# Patient Record
Sex: FEMALE | Race: WHITE
[De-identification: names, ages, dates, MRNs, and addresses within clinical notes are randomized per-mention and may not be internally consistent; named-entity substitution may affect disease eponyms.]

---

## 2017-04-27 NOTE — NUR
PT REPOSITIONED TO RIGHT LATERAL POSITION. PILLOWS PLACED BEHIND PT BACK AND
BUTTOCK FOR SUPPORT. PT RESTING WITH EYES CLOSED AND RESP EVEN AND UNLABORED,
O2 SAT 97% ON 4L VIA NC.

## 2017-04-27 NOTE — NUR
REPORTED BEING DIAPHORETIC, SPOT CHECK OF BLOOD SUGAR 118.  AIR CONDITIONER
TURNED ON.  ALERT AND ORIENTED.  TELE 28.  IVF NS @ 10 TO LAC WNL.  REDNESS
NOTED TO BLE.  BUTTOCK WITH REDNESS NOTED.  LUNG SOUNDS CLEAR, O2 AT 4L VIA
NC, NO SOB NOTED.  OBESE, BOWEL SOUNDS ACTIVE.  DICKENS TO GRAVITY.  NEEDS
ASSISTANCE IN AND OUT OF BED.  CALL LIGHT WITHIN REACH.

## 2017-04-27 NOTE — NUR
SEEN PATIENT ASLEEP, AROUSABLE, OPEN HER EYES AND WENT BACK TO SLEEP
RIGHTAWAY. NO RESPIRATORY DISTRESS NOTED ON O2 4LPM N/C. HOB ELEVATED AT
30DEG. IVF NS INFUSING AT 10ML/HR. DICKENS CATH DRAINING TO GRAVITY. REDNESS TO
BLE, OBDULIO. REDNESS TO BOTH BUTTOCKS AND ULCER TO RIGHT BUTTOCK, Z GUARD
APPLIED. WILL REPOSITION Q 2HRS AS TOLERATED. FALL PRECAUTION MANTAINED. CALL
LIGHT PLACED WITHIN REACH. SIDERAILS UP X2.

## 2017-04-27 NOTE — NUR
HAS BEEN NPO ALL DAY.  PER DR GONZALEZ, HE SPOKE WITH RADIOLOGY AND ASKED THEM
TO COME DO SBFT NOW.  THEY WILL COME UP AS SOON AS THEY CAN.

## 2017-04-27 NOTE — NUR
RECEIVED FROM ER VIA [employer] AWAKE, ALERT, ORIENTED X4. SLIGHTLY HAVING SOB, O2
4LPM APPLIED WITH HUMIDIFIER. HOB ELEVATED AT 30DEG. TELE INPLACED. O2SAT 94%
SLIGHTLY [place] NOTED. GENERALIZED BODY WEAKNESS. REDNESS TO BLE, OBDULIO. REDNESS TO
BUTTOCKS WITH ULCER TO RIGHT BUTTOCK, PLEASE SEE FROM WOUND PHOTO. BRUISE TO
RIGHT UPPER ARM FROM S/P FALL AT HOME. DICKENS CATH INSERTED IN ER WITH YELLOW
URINE COLORED NOTED. IV ACCESS TO LAC INTACT AND PATENT. CALL LIGHT INSTRUCTED
AND PLACED WITHIN EASY REACH. SIDERAILS UP X2.

## 2017-04-27 NOTE — NUR
FSBS-158, 3 UNITS OF IR GIVEN. MORPHINE 2MG IVP GIVEN FOR RIGHT FINGERS PAIN
5/10 ON PAIN SCALE. IVF NS INFUSING AT 10ML/HR. PERIAREA CLEANED AND Z GUARD
APPLIED, REPOSITIONED TO LEFT SIDE.

## 2017-04-27 NOTE — NUR
HYDRAZALINE GIVEN.  ATTEMPTED TO GET SIGNATURE ON CONSENT FOR RELEASE OF INFO
FROM DR MARTÍNEZ, BUT FELL ASLEEP.

## 2017-04-27 NOTE — NUR
SEEN PATIENT HAVING A RBEATHING TX BY RT. HER FRIEND AT BEDSIDE. EASILY TO
AROUSE. INFORMED PATIENT THAT SBFT WILL BE DONE TONIGHT IF PATIENT CAN
TOLERATE THE PO CONTRAST. PATIENT STS" I WILL TRY". DOCTOR TAT AT BEDSIDE. IVF
NS TKO AT 20ML/HR INFUSING WELL. DICKENS CATH DRAINING TO GRAVITY YELLOW URINE
COLORED. SCD TO BLE. WILL CONTINUE TO MONITOR.

## 2017-04-27 NOTE — NUR
AWAKE,ALERT, ORIENTED X4. ASISTED TO GET OUT OF BED AND SIT ON THE CHAIR AT
BEDSIDE. AIR MATTRESS PROVIDED. S/L FLUSHED. PATIENT OFF FLOOR FOR SBFT VIA
WHEELCHAIR. NO AUCTE DISTRESS NOTED.

## 2017-04-27 NOTE — NUR
UNABLE TO TOLERATED SBFT PROCESS. JUAN F BACK VIA WHEELCHAIR. NO RESP DISTRESS
NOTED. O2 4LPM N/C MAINTAINED. DOCTOR AISHA INFORMED. ANGELA WILL HAVE KUB
DONE IN AM.

## 2017-04-27 NOTE — NUR
CONSTANTLY ON CALL LIGHT FOR VARIOUS REQUESTS.  FRIEND MELISSA IN TO SEE PATIENT
AND CALM AT THIS TIME.  ATIVAN WAS GIVEN, SLEEPING.  RT IN TO GIVE RX.

## 2017-04-27 NOTE — NUR
RT GIVING RX.  FRIEND MELISSA CONTINUES BEDSIDE.  ATTEMPTED TO GET SIGNATURE ON
CONSENT FORM TO GET MEDICAL RECORDS FROM DR MARTÍNEZ, UNABLE TO SIGN AT THIS TIME.

## 2017-04-27 NOTE — NUR
PATIENT WAS BROUGHT IN BY EMS WITH COMPLAINT OF SHORTNESS OF BREATH X 4 DAYS.
SEEN WITH LABOURED RESPIRATION. SATURATION 78 ON ROOM AIR. O2 GIVEN VI A MASK.
SATURATION SLOWY CAME UP %. PATIENT HAS HISTORY OF COPD AND CHF.

## 2017-04-28 NOTE — NUR
Awake and verbally responsive. No Resp.distress noted on humidified 02 2lpm
via n/c. Denies pain at this time. HOB elevated. On low air loss mattress.
SCD in place. Will cont.to monitor. Call light within reach.

## 2017-04-28 NOTE — NUR
PATIENT REPORTS PAIN ASSOCIATED WITH DICKENS CATHETER. DICKENS IS PULLED TAUT.
MOVED STAT LOCK TO A CLOSER POSITION SO IT DOES NOT PULL. PATIENT REPORTS
IMPROVEMENT BUT STILL SOME SORENESS. NORCO GIVEN FOR PAIN PER MAR. GOOD
PERICARE PROVIDED AND STICKER PLACED ON DICKENS BAG. ASSISTED PATIENT BACK TO
BED TO POSITION OF COMFORT. WILL MONITOR.

## 2017-04-28 NOTE — NUR
UP WITH PT, DESATS INTO 80s WITH EXERTION, RETURNS TO 92-93 ON 2L NC WITH
REST. UP IN CHAIR AFTERWARD.

## 2017-04-28 NOTE — NUR
***PT NOTE***
 
[phone]
Pt IS A 70 Y/O FEMALE ADMITTED DUE TO SOB; DX WITH ACUTE RESP FAILURE, COPD
EXACERBATION, CHF EXACERBATION.
CXR- INCREASED PULMO CONGESTION AND EDEMA
XRAY ABD- NO EVID OF BOWEL OBSTRUCTION/FREE AIR
PMH: DM, OBESITY, HTN, COPD, PACEMAKER, AFIB, RA
Pt LIVES ALONE IN A SSH, WAS INDEP IN ADLs AND AMBULATION USING FWW W/SEAT FOR
HOUSEHOLD DISTANCE; USES 4WW FOR COMMUNITY; ON HOME O2 @ 2L NC, PER Pt SHE HAS
BEEN USING O2 CONSISTENTLY LATELY.
Pt WAS CLEARED FOR PT PER RN. Pt WAS SEEN AWAKE RESTING IN BED; AGREED TO
PARTICIPATE W/PT. HEPLOCKED BY RN
S:REPORTS LOWER ABD PAIN 5/10
O:BP AT REST 167/71, HR 70; SaO2 90-92% ON 2L NC AT REST
BED MOBILITY: SUPINE<->SIT CGA
TRANSFERS: SIT-STAND CGA
GAIT 75 FT FWW CGA; FLAT FOOT ON LLE WITH GENU VALGUM; SLOW TAMARA;
DENIES DIZZINESS DURING GAIT; PORT O2 IN TOW; SaO2 ON 2L NC POST ACTIVITY
FLUCTUATES TO 86-90%; RN AWARE.
Pt WAS ASSISTED IN SITTING ON A CHAIR BY BEDSIDE; TABLE AND CALL LIGHT IN
REACH; NOTIFIED Pt TO ASK NSG FOR ASSIST IN BTB; LEFT IN THE CARE OF THE
RN AND FRIEND.
A:Pt DEMONSTRATES WEAKNESS, DECREASED BALANCE/GAIT SAFETY. SaO2 DESAT
DURING ACTIVITY EVEN ON SUPPLEMENTAL O2. Pt WAS EDUCATED ON PROPER HAND
PLACEMENT FOR SIT-STAND, GOOD RETURN DEMO.
P:POC TO INCLUDE THEREX, THERACT, GAIT TRAINING, BALANCE EX, SAFETY EDUC;
ONCE DAILY 6X/WK X1 WEEK. HHPT IS RECOMMENDED POST ACUTE TO CONT W/REHAB.
DX AND POC DISCUSSED W/PTA.
 
[id number]
[id number], [id number], TUG SCORE=11sec
 
[id number]
THEREX FOR BOTH LE PERFORMED IN SITTING INCLUDING LAQ, ANKLE PUMPS, SEATED
MARCHING; X 10 REPS EACH.
THEREX8
PVE(1) ASSIST TO BATHROOM DUE TO BM

## 2017-04-28 NOTE — NUR
HAVING SOB ON EXERTION AT TIMES. GET OUT OF BED WITH ASSISTANCE. ON LOW AIR
MATTRESS. REPOSITIONED Q 2HRS. HAD SMALL BM X1. Z GUARD APPLIED TO BUTTOCKS.
REFUSED SCD. APPLE JUICE GIVEN X3 LAST NIGHT TOLERATED WELL. IVF TO LAC
INFUSING WELL.

## 2017-04-28 NOTE — NUR
A/OX4, DENIES HEADACHE OR DIZZINESS, SPEECH CLEAR AND SLOW, WORD FINDING
DIFFICULTY AT TIMES, COMMUNICATES NEEDS. TELE 28, PACED, RATE IN 70s, NO
COMPLAINT OF CHEST PAIN. PALPABLE PERIPHERAL PULSES. TRACE EDEMA TO RLE, NONE
TO LEFT. LUNGS CTA DESIRAE, REG RESPS, DYSPNEA ON EXERTION. 2L NC 92%. ABD OBESE,
NONTENDER, BOWEL SOUNDS ACTIVE, NO COMPLAINT OF MELYSSA/VTG/AMELIA. BM THIS MORNING,
SOFT. DICKENS IN PLACE DRAINING YELLOW URINE TO GRAVITY. GENERALIZED WEAKNESS,
UP WITH PT, AIR MATTRESS, ABLE TO REPOSITION SELF. REDNESS TO BLE. WILL
MONITOR.

## 2017-04-28 NOTE — NUR
Initial Nutrition Assessment
 
Dx: CHF, Chest Pain
PMHx: DM, obesity, HTN, COPD, pacemaker, a fib, RA
PSHx: Arthroscopy, cardiac ablation (2014), hysterectomy, pacemaker
Labs:  H, BUN 36 H, Cr 1.6 H, H/H 10.5/33 L; (4/27) ALB 3.2 L, A1C 5.8
Meds: Aldactone, Ativan, Colace, D50, humulin R, Lasix, NS IV, theragran,
zofran
Current Diet Order: Cardiac
PO Intakes: (4/27) L: Refused
Ht: 68", 5' 8". Wt: 209 lb, 95 kg. BMI: 31.9 kg/m2 (Obesity Class I)
IBW: 140 lb, 64 kg. %IBW: 148%. Adj BW: 157 lb, 71 kg. UBW: Pt unsure.  Wt Hx:
(8/16/16) 224 lb, 102 kg.
Age: 70 Y/O F
Food Allergies: None
Skin: Redness to both legs, shin area, redness to buttocks with ulcer to R
buttock. Sanchez 14. Per chart, pt has stasis dermatitis bilateral legs and
stage I pressure ulcer on buttocks.
Edema: 2+ BLE
GI: Active bowel sounds. Last BM 4/26.
 
Pt found with acute respiratory failure secondary to acute exacerbation of CHF
vs COPD, ACSDHF,  with cardiomyopathy, LVH, R atrial enlargement per
doctor's notes. Per doctor's progress note 4/28, no acute events overnight,
abd X-ray showed no evidence of bowel obstruction of free air, pt could not
tolerate small bowel follow through exam, preliminary results showed
nonobstructive gas pattern and bilateral lung bas atelectasis. Per [facility]
reports, possible palliative care, Dr. Nichols to see pt.
Per Dr. Nichols's note 4/27, pt is DNR, does not want intubation, non-compliant
with portable oxygen, interested in EOL option, but not ready to diet, pt
would need hospice or home health in order to return home, refused assisted
living.
Pt was seen resting in bed, lunch seen in front of pt, mostly untouched,
visitors at bedside. Pt appears overly-nourished. Pt reported she does not
like the sandwich and the watermelon tasted funny, would prefer to wait for
dinner tray. Pt also requested for oatmeal and 2% milk for breakfast, and a
menu to fill out. RD acknowledged and informed S staff to provide. RD also
offered snacks in between meals, pt declined.
 
Problem with: N: None. V: None. D: None. C: None.
Problems with: Chewing/Swallowing: None - Verified with pt
Current Appetite: Poor
Recent Weight Change: Yes per pt, however, unsure. Per EMR, -15 lb.
% Weight Change: 6.7% weight loss within 8 months
Vitamin/Supplement use: None
Diet at Home: Cardiac, CCHO, Fluid Restriction
Physical Activity: None
Education: RD discussed increasing PO intakes to aid in wound healing and
adequate nutrition status. Pt acknowledged.
 
Estimated Nutritional Needs Based  lb, 64 kg
Energy: 9629-4437 kcal/day (25-30 kcal/kg for Geriatric Maintenance)
Protein: 77-96 gm/day (1.2-1.5 gm/kg for COPD, Wound Healing)
Fluids: <1200 ml/day (CHF) or per doctor for CHF
 
Nutrition Diagnosis
1. Increase protein needs related to altered skin integrity as evidenced by
stage I pressure ulcer to R buttock
2. Inadequate oral intakes related to food preferences as evidenced by meal
refusals due to pickiness
 
Intervention
1. Recommend liberalize to 2 gm Na diet. No further restrictions warranted due
to low PO intakes.
2. Consider Boost Glucose Control TID if PO intakes <50%. This provides 750
kcal and 42 gm protein daily to aid in low PO intakes.
3. Encourage PO intakes.
4. Recommend Vitamin C 500 mg BID.
 
Monitor/Evaluate
Goal: PO intakes to meet at least 30% of estimated needs with tolerance
Monitor: PO intakes, tolerance to diet, labs, skin integrity, GI function,
weights
F/U in 3-5 days as MODERATE risk (5/1-5/3)

## 2017-04-28 NOTE — NUR
1. Recommend liberalize to 2 gm Na diet. No further restrictions warranted due
to low PO intakes.
2. Consider Boost Glucose Control TID if PO intakes <50%. This provides 750
kcal and 42 gm protein daily to aid in low PO intakes.
3. Encourage PO intakes.
4. Recommend Vitamin C 500 mg BID.

## 2017-04-28 NOTE — NUR
BEDSIDE REPORT RCD FROM DAVID ULRICH. PATIENT AWAKE ALERT, NO DISTRESS NOTED. NS
10 ML/HR TO RIGHT HAND KVO RATE, SITE PATENT. O2 2L NC, 93%. BED LOW, CALL
LIGHT IN PLACE. WILL MONITOR.

## 2017-04-29 NOTE — NUR
PT NODDING OFF IN CHAIR, TRANSFERRED BACK TO BED. ADMIN ATIVAN AS ORDERED FOR
FEELINGS OF ANXIETY. PATIENT FELL ASLEEP, NO SIGNS OF DISTRESS WILL CONTINUE
TO MONITOR.

## 2017-04-29 NOTE — NUR
PT SLEEPING. BREATHING UNLABORED, NO SIGNS OF DISTRESS. CALL LIGHT WITHIN
REACH. WILL CONTINUE TO MONITOR.

## 2017-04-29 NOTE — NUR
RECEIVED PT IN BED AWAKE, ALERT,ORIENTED X4. SHE DENIED HAVING PAIN AT THIS
TIME. LUNG SOUNDS DIMINISHED. ON O2 AT 2L VIA N/C. NO SOB AT THIS TIME.
BOWEL SOUNDS ACTIVE. W/ DICKENS CATH INTACT AND DRAINING YELLOW URINE. W/ TRACE
EDEMA TO BLE. PT ON AIR MATTRESS. W/ IVF NS AT 10 CC/HR INFUSING VIA RT HAND.
CALL LIGHT W/IN REACH.

## 2017-04-29 NOTE — NUR
PATIENT REQUESTED TO BE TRANSFERED TO CHAIR. ASSISTED WITH TRANSFER, CALL
LIGHT WITHIN REACH, TRANSFERED SAFELY. WILL CONTINUE TO MONITOR.

## 2017-04-29 NOTE — NUR
CLIENT ALERT AND ORIENTED X4 TO PERSON,PLACE, TIME, AND PURPOSE. DENIES CHEST
PAIN. TELE #28. NASAL CANNULA ON 2 LITERS. IV TO RIGHT HAND, INTACT. DICKENS
CATHETER IN PLACE PATENT, URINE YELLOW. SCD'S TO LOWER EXTREMITIES. CALL LIGHT
WITHIN REACH. BED IN LOWEST POSITION, WILL CONTINUR TO MONITOR.

## 2017-04-29 NOTE — NUR
INFORMED DR. CASTILLO THAT PT HAD EPISODE OF PVC'S AND PT HAS A PACEMEKER. PT HAD
NO C/O CHEST DISCOMFORT.

## 2017-04-29 NOTE — NUR
PATIENT STATES SHE IS FEELING VERY ANXIOUS/JITTERY, HOSPICE NURSE AT BEDSIDE.
WILL MEDICATE PRN (SEE EMAR).

## 2017-04-29 NOTE — NUR
Afebrile. No SOB noted.  Norco 1 tab given as ordered for c/o neck, shoulder
pain and seems helpful. Turned and repositioned self in bed. Weight shift
assistance provided as needed. Esquivel catheter care rendered. Cont.on IV flagyl
and levaquin. Paced rhythm. Cont.to monitor lab values.

## 2017-04-30 NOTE — NUR
ASSISTED CLIENT TO RESTROOM. HAD SMALL SOFT BM. PT COMPLAINS HAVING LEFT KNEE
PAIN 8/10. WILL MEDICATE PRN SEE EMAR.

## 2017-04-30 NOTE — NUR
PATIENT FINISHED 100% OF LUNCH. SITTING IN CHAIR TALKING WITH VISITOR.
BREATHING UNLABORED.NO SIGNS OF DISTRESS. WILL CONTINUE TO MONITOR.

## 2017-04-30 NOTE — NUR
ROUND WERE MADE. DR KHAN DISCUSSED DISCHARGE PLANNING POSSIBLT TOMORROW.
PATIENT UNDERSTAMDS INFORMATION GIVEN. NO QUESTIONS OR CONCERNS AT THIS TIME.

## 2017-04-30 NOTE — NUR
RECEIVED PT SITTING UP IN BED. SHE IS ALERT,ORIENTED X4. LUNG SOUNDS CLEAR BUT
DIMINISHED. PT STATED SHE IS BREATHING BETTER AND SOB IS LESSENED. ON O2 AT 2L
VIA N/C. NO C/O ABDL DISCOMFORT AT HIS TIME. W/ DICKENS CATH INTACT AND DRAINING
YELLOW URINE. W/ IVF NS AT 10 CC/HR INFUSING WELL VIA RT HAND. CALL LIGHT IS
W/IN REACH.

## 2017-04-30 NOTE — NUR
PT SLEPT THROUGH THE NIGHT. SHE HAD NO C/O PAIN. NO EPISODE OF SOB OR RESP.
DISTRESS. DICKENS CATH INTACT AND PATENT. IVF NS AT 10 CC/HR INFUSING WELL VIA
RT HAND. ALL NEEDS ATTENDED TO.

## 2017-04-30 NOTE — NUR
PT C/O PAIN TO HER LT KNEE 8/10 AFTER AMBULATING TO THE RESTROOM. MEDICATED
HER W/ NORCO 1 TAB PO FOR PAIN. ALSO MEDICATED W/ ATIVAN 1 MG PO FOR ANXIETY
AND FOR SLEEP .

## 2017-04-30 NOTE — NUR
PATIENT UP SITTING IN CHAIR, ALERT AND ORIENTEDX4. ABLE TO MAKE NEEDS KNOWN.
BREATHING UNLABORED, NO SIGNS OF DISTRESS. CALL LIGHT WITHIN REACH, WILL
CONTINUE TO MONITOR.

## 2017-04-30 NOTE — NUR
PATIENT SLEEPING, EASILY AOKEN WITH VERBAL STIMULI. ALERT AND ORIENTED X4.
BREATHING UNLABORED, NO SIGNS OF DISTRESS AND NO COMPLAINTS OF PAIN AT THIS
TIME. WILL CONTINUE TO MONITOR.

## 2017-05-01 NOTE — NUR
AT BEDSIDE ASSESSING PATIENT, ALL QUESTIONS AND CONCERNS
ADDRESSSED, CALL LIGHT WITHIN REACH, AND WILL CONTINUE TO MONITOR.

## 2017-05-01 NOTE — NUR
PATIENT HAD SECOND BM AND ASSISTED TO RESTROOM, ALSO CLEANED RIGHT BUTTOCKS
WOUND WITH WOUND CLEANSER AND APPLIED TEGADERM PER DOCTORS ORDER, AND ALSO
APPLIED Z-GUARD TO BUTTOCKS, PATIENT BACK ON CHAIR AT BEDSIDE, CALL LIGHT AND
BELONGINGS WITHIN REACH, AND WILL CONTINUE TO MONITOR.

## 2017-05-01 NOTE — NUR
PT REMAINS ASLEEP BUT EASILY AROUSABLE. SHE WAS MEDICATED FOR PAIN X1.
PT ABLE TO AMBULATE TO RESTROOM W/ ASSISTANCE. STILL W/ SOB ON EXERTION NOTED.
DICKENS CATH INTACT AND PATENT . PT HAD BM X1 THIS SHIFT. WOUND TO RT BUTTOCK
CLEANED AND APPLIED W/ Z-GUARD. DRESSING CHANGED. IVF NS AT 10 CC/HR INFUSING
VIA RT HAND.

## 2017-05-01 NOTE — NUR
PATIENT AAOX4, SITTING UP IN BED WATCHING TV, NO DISTRESS NOTED, RESPIRATIONS
EVEN AND UNLABORED AND ON 2L O2NC, DENIES PAIN, CALL LIGHT AND BELONGINGS
WITHIN REACH, AND WILL ENDORSE TO NIGHT NURSE.

## 2017-05-01 NOTE — NUR
PATIENT C/O ACHING PAIN TO LEFT KNEE 6/10, TORADOL 30MG IV Q8H PRN GIVEN FOR
PAIN, PATIENT BACK IN BED, FALL PRECAUTIONS IN PLACE, CALL LIGHT AND
BELONGINGS WITHIN REACH, AND WILL CONTINUE TO MONITOR.

## 2017-05-01 NOTE — NUR
MADE AWARE OF /45 HR 68, WILL GIVE ACDACTONE 25MG, CARDIZEM
120MG, LASIX 40MG, BUT TO HOLD LISINOPRIL 20MG, APRESOLINE 100MG, AND
LOPRESSOR 25MG, WILL FOLLOW ORDERS AND WILL CONTINUE TO MONITOR.

## 2017-05-01 NOTE — NUR
MADE AWARE OF /46 HR 68, WILL GIVE ACDACTONE 25MG, CARDIZEM
120MG, LASIX 40MG, BUT TO HOLD LISINOPRIL 20MG, APRESOLINE 100MG, AND
LOPRESSOR 25MG, WILL FOLLOW ORDERS AND WILL CONTINUE TO MONITOR.

## 2017-05-01 NOTE — NUR
RECEIVED PATIENT AAOX4, ABLE TO COMMUNICATE4 AND FOLLOW COMMANDS, NO DISTRESS
NOTED, TELE# 28 IN PLACED AND DENIES CHEST PAIN. PALPABLE PULSES TO BUE/BLE.
RESPIRATIONS EVEN AND UNLABORED, ON 2L O2 NC AND DENIES SOB, AND O2 SAT 94%.
DENIES N/V/D. VOIDS FREELY WITH DICKENS CATHEDER IN PLACE FLOWING YELLOW COLORED
URINE FREELY BY GRAVITY. ON AIR MATTRESS, WITH WOUND TO RIGHT BUTTOCKS COVERED
IN ISLAND WOUND DRESSING CDI. DENIES PAIN. NS INFUSING AT 10ML/HR FREELY TO RH
CDI WITH NO SIGNS OF INFILTRATION NOTED. BED TO LOWEST POSITION, SIDE RAILS UP
X2, CALL LIGHT AND BELONGINGS WITHIN REACH, FALL PRECAUTIONS IN PLACE, AND
WILL CONTINUE TO MONITOR.

## 2017-05-01 NOTE — NUR
***PT NOTES***
TIME 4635-7680
S: CLEARED BY RN FOR P.T. TX. PATIENT IS AWAKE & ALERT IN A SEMI BARRIENTOS
POSITION IN BED. AGREEABLE TO P.T. TX. C/O L KNEE PAIN 6/10. RN NOTIFIED.
O: VS AT REST /41, HR 70 BPM ,SPO2 ON 2LPM 96%
   BED MOBILITY: SUPINE>SIT SBA/INDEPENDENT.
   TRANSFER: SIT<>STAND W/ FWW CGA. TOILETING W/ CGA. VC GIVEN PROPER
HANDPLACEMENT FOR SIT<>STAND TRANSFERS. ADDITIONAL CUES W/ USE OF RESTROOM
GRAB BAR TO ASSIST W/ TOILETING.
   GAIT: 45FT W/ FWW CGA. PORTABLE O2 TANK IN TOW @ 2LPM. PATIENT DEMO'S
ANTALGIC GAIT W/ INCREASE ANTERIOR TRUNK LEAN. SHORTENED STEP LENGTH. REQUIRES
STANDING REST PERIODS D/T FATIGUE. GAIT LIMITED AT THIS TIME D/T L KNEE PAIN
& DECREASE ENDURANCE. VC GIVEN TO CORRECT POSTURAL ALIGNMENT & PROPER GAIT
SEQUENCE. EDUCATED PATIENT ON DEEP BREATHING TECHNIQUES, SAFETY FOR FALL
PREVENTION, & REPOSITIONING TO PREVENT FURTHER SKIN BREAKDOWN. PATIENT
VERBALIZED UNDERSTANDING.
INSTRUCTED PATIENT IN THER EX FOR BILAT LE/UE AS DAVIN. PATIENT IS SAFELY &
COMFORTABLY SITTING UP IN A CHAIR BY BEDSIDE W/ CALL BUTTON & TABLE IN REACH.
O2 VIA NC IN PLACE. LEFT IN CARE OF RN.
P: DISCUSSED W/ PRIMARY PHYSICAL THERAPIST
[id number]',[id number]',[id number]((2) ADDITIONAL STAFF PRESENT FOR SAFETY)

## 2017-05-01 NOTE — NUR
PT SEEN, ASLEEP BUT EASILY AROUSABLE, ALERT AND ORIENTED WITH PERIOD OF
FORGETFUL, DENIES HEADACHE OR DIZZINESS, BREATHING EVEN AND UNLABORED, NO SOB,
LUNG SOUNDS DIMINISHED, ON O2 2L VIA NC WITH NO RESP DISTRESS NOTED, RT
PROTOCOL, IVF INFUSING WELL, PULSES PALPABLE, NO EDEMA NOTED, SCD TO BLE, PT
ON AIR MATTRESS, ABD OBESE WITH ACTIVE BS, NO BM AT THIS TIME, DENIES ABD
PAIN, INCONTINENT AT TIMES, FOELY DC'D BY DAY SHIFT, STILL WAITING PT TO VOID,
SMALL PRESSURE ULCER TO RT BUTTOCKS, COVERED WITH DRESSING, BED ALARM ON, SIDE
RAILS UP, NO DISTRESS NOTED, WILL KEEP TO MONITOR.

## 2017-05-02 NOTE — NUR
DR. LEONARD ORDERED TO URINARY CATHETER INSERTION FOR BLADDER TRAINING PURPOSE
IF PT IS NOT ABLE TO URINATE. HOWEVER, PT URINATED AT 1800 AFTER LASIX GIVEN.
PAGED DR. LEONARD BUT NO CALL BACK. WILL ENDOSE COMING NURSING TO FOLLOW UP AND
MEASURE PT'S OUTPUT. PT BREATHING ON O2 2L VIA NC. EVEN, UNLABORED. IV SITE
PATENT, INTACT. IVF HEP LOCK AT THIS TIME.

## 2017-05-02 NOTE — NUR
***PT NOTES***
TIME 6810-5127
S: CLEARED BY RN FOR P.T. TX. PATIENT IS AWAKE & ALERT IN A SEMI BARRIENTOS
POSITION IN BED. AGREEABLE TO P.T. TX. C/O L KNEE PAIN 7/10. RN IS AWARE.
O: VS AT REST /46, HR 70 BPM, SPO2 ON 2LPM 97%
   BED MOBILITY: SUPINE<>SIT INDEPENDENT.
   TRANSFER: SIT<>STAND W/ FWW CGA. STILL CONTINUES TO REQUIRE VC W/ PROPER
SEQUENCE FOR A SIT<>STAND TRANSFER.
   GAIT: 60FT W/ FWW CGA. PATIENT DEMO'S A DECREASE GAIT VELOCITY. REQUIRES 3
STANDING REST PERIODS. PORTABLE O2 TANK IN TOW @ 2LPM VIA NC. GAIT DISTANCE
LIMITED D/T L KNEE PAIN & DECREASE ENDURANCE. L KNEE GENU VALGUS. ANTALGIC
GAIT. REQUIRES VC FOR PROPER GAIT SEQUENCE. INCREASE ANTERIOR TRUNK LEAN W/
CUES TO CORRECT POSTURAL ALIGNMENT. EDUCATED PATIENT ON DEEP BREATHING
TECHNIQUES, ENERGY CONSERVATION, & SAFETY FOR FALL PREVENTION W/ G
UNDERSTANDING. COOPERATIVE & APPRECIATIVE OF CARE.
THER EX SHOULDER FLEXION, ELBOW FLEX/EXT, ANKLE DF/PF, KNEE FLEX/EXT AS DAVIN.
PATIENT IS SAFELY & COMFORTABLY SITTING UP IN A CHAIR BY BEDSIDE W/ CALL BUTON
& TABLE IN REACH. LEFT IN CARE OF RN.
P: DISCUSSED W/ PRIMARY PHYSICAL THERAPIST
[id number]',TA10',[id number]'

## 2017-05-02 NOTE — NUR
PT SEEN SLEEPING BUT AROUSABLE. PT BREATHING ON O2 2L VIA NC, EVEN, UNLABORED.
DICKENS IN PLACE, DRAINING FREELY VIA GRAVITY. URINE COLOR YELLOW. IV SITE
PATENT, INTACT. IVF INFUSING WELL.

## 2017-05-02 NOTE — NUR
PATIENT ASLEEP AND EASILY AROUSABLE. SLEPT MOST OF THE NIGHT. IVF INFUSING
WELL. NO BM DURING THE SHIFT. MORNING  MG/DL. ON AIR MATTRESS. ON THE O2
2L NC. NO RESP DISTRESS OR SOB NOTED. BED ALARM ON.

## 2017-05-02 NOTE — NUR
ASSISTED PT TO BSC, PT UNABLE TO URINATE AT THIS TIME, PT BLADDER IS
DISTENDED, PERFORMED BLADDER SCAN- PT WITH 400 ML URINE IN BLADDER, MADE DR SAUER AWARE, NEW ORDER RECEIVED FOR DICKENS CATH INSERTION, DICKENS PLACED AND PT
TOLERATED WELL.

## 2017-05-02 NOTE — NUR
RECEIVED REPORT FROM TRINH BA SEEN LYING IN BED, HAS HER EYES CLOSED. NO
SOB/PAIN NOTED. ON 2LPM/NC W/ HUMIDIFIER. ON AIR MATTRESS. W/ BEDSIDE COMMODE.
POSITIONED ON HER RIGHT SIDE AT THIS TIME. CALL LIGHT ON REACH. SIDE RAILS
UPX2. WILL CONT TO MONITOR.

## 2017-05-02 NOTE — NUR
RECEIVED Pt AAOX3 CALM AND COOPERATIVE WITH CARE. WITH VISITOR AT BEDSIDE. Pt
DENIES ANY CHEST PAIN. LUNG SOUND ARE CTA/DIMINISHED TO BASES. PULSE OX IS 96%
ON 2L/NC. NO RESP DISTRESS NOTED. ACTIVE BOWEL SOUNDS X4 QUADS. DENIES ANY ABD
DISCOMFORT. VOIDS FREELY, USING THE BEDSIDE COMMODE. BUTTOCKS AREA WITH
TRGADERMS IN PLACE CDI AND Z-GUARD. RADIAL AND PEDAL PULSES ARE PRESENT. MOVES
ALL EXTREMITIES. FALL PRECAUTIONS IN PLACE. Pt RE-ORIENTED TO ROOM, CALL LIGHT
SYTEM AND POC. WILL CONTINUE TO MONITOR.

## 2017-05-02 NOTE — NUR
B/P MEDICATIONS WITHELD AT THIS TIME, DUE TO BLOOD PRESSURE READING /50
HR 65. MEDICATED WITH ATIVAN 1 MG PO AS REQUESTED BY Pt. ASSISTED TO BEDSIDE
COMMODE AND VOIDED 350 ML OF YELLOW CLEAR URINE. Pt ASSISTED BACK TO BED.
2L/NC IN PLACE, CALL LIGHT WITHIN REACH. WILL CONTINUE TO MONITOR.

## 2017-05-03 NOTE — NUR
DR FRIEDMAN AND MEDICAL TEAM IN ON ROUNDS. PT SLEEPING. PLAN FOR TRANSFER HOME
FOR HOME HOSPICE TODAY.

## 2017-05-03 NOTE — NUR
DR AMIN IN TO SEE PT. PT REPORTS "I HAVE TO GET ON COMMODE NOW, ATTEMPTS TO
GET OOB BY HERSELF. INSTRUCTED TO CALL NURSE WHEN NEEDS TO GET OOB DUE TO FALL
PRECAUTIONS. PT ALERT TO PERSON,  AND HOSPITAL NAME. FORGETFUL TO DATE AND
TIME. REORIENTED. TEMP 97.4. TELE #[id number] PACED AT RATE 71. RESP 18 EVEN. BREATH
SOUNDS DIMINISHED. NO COUGH OR SOB. PULSE OX 99% ON OXYGEN 2L NC. HOB
ELEVATED. ABD ROUNDED BUT SOFT, BOWEL TONES PRESENT. NO EDEMA. PULSES PRESENT.
SCD IN PLACE. SALINE LOCK RIGHT HAND NOTED. ON AIR MATTRESS FOR PRESSURE
RELIEF. PT IS DNR, PURPLE ARMBAND IN PLACE. PT ABLE TO SIT ON SIDE OF BED WITH
ASSIST. PT IS WEAK. PREFERS TO STAND ON HER OWN WITHOUT ASSISTANCE. TWO NURSES
WITH PT TO MAINTAIN SAFETY. VOIDED 750CC YELLOW URINE. PASSED GAS, NO BM.
PERICARE PROVIDED. NOTED OPEN ULCER TO RIGHT BUTTOCK. ZGUARD APPLIED.
REPOSITIONED FOR COMFORT. ONCE BACK IN BED RETURNS QUICKLY TO SLEEP, SLOW TO
AROUSE. STATES "I GOT IT." ABLE TO TAKE AM MEDS. WAS GIVEN ATIVAN PO LAST
NIGHT AT . WILL CONTINUE TO MONITOR. SIDE RAILS UP X3. CALL LIGHT IN
REACH. BED ALARM ON TO MAINTAIN SAFETY. WILL CONTINUE TO MONITOR.

## 2017-05-03 NOTE — NUR
PT SITTING UP IN CHAIR. BREATHING ON ROOM AIR. NO COUGH OR SOB. DR LEONARD
HERE. UPDATED DISCHARGE INFORMATION. AWAITING FAMILY FOR TRANSPORTATION HOME.

## 2017-05-03 NOTE — NUR
NIECE HERE TO TAKE PT HOME. IV REMOVED CATH TIP INTACT. ARMBAND REMOVED.
REVIEWED DISCHARGE PACKET AND INFORMATION WITH PT AND NIECE. PTS FRIEND SIN
TO BE PCG AT HOME. PT HAS MEDS AT HOME. SPOKE WITH LAURA WITH MAXINE AND UPDATED
WITH PT STATUS. PURPLE FOLDER GIVEN TO PT AND FAMILY TO TAKE HOME. PT
VERBALIZED UNDERSTANDING. DR MARTÍNEZ TO SEE PT ON FRIDAY. DC VIA WHEELCHAIR WITH
BELONGINGS TO PRIVATE CAR.

## 2017-05-03 NOTE — NUR
SKIN INTEGRITY NOTED WITH BILATERAL BUTTOCKS REDDENED. RIGHT BUTTOCK ULCER
CLEANSED WITH WOUND CARE CLEANSER. PAT DRY. DC PHOTO TAKEN. TEGADERM APPLIED.
ADDITIONAL SUPPLIES GIVEN FOR HOME CARE. PT ON AIR MATTRESS, ENCOURAGED CHANGE
OF POSITION FREQUENTLY TO AVOID PRESSURE TO RIGHT BUTTOCKS. VERBALIZED
UNDERSTANDING.

## 2017-05-03 NOTE — NUR
PT SLEEPING. RESP EVEN UNLABORED PULSE OX 98% ON OXYGEN 2L NC. BREATH SOUNDS
DIMINISHED. HOB ELEVATED.  TELE #28 PACED RATE 71. ABD ROUNDED BUT SOFT, BOWEL
TONES PRESENT. NO EDEMA. PULSES PRESENT. SCD IN PLACE. SALINE LOCK RIGHT HAND
IN PLACE. PT IS DNR. PURPLE ARMBAND IN PLACE. PT IS HOSPICE GIP, PLAN FOR
DISCHARGE HOME TODAY WITH HOME HOSPICE. PT ON AIR MATTRESS FOR PRESSURE RELIEF
AND COMFORT. SIDE RAILS UP X3. CALL LIGHT IN REACH. BED ALARM ON TO MAINTAIN
SAFETY. FALL PRECAUTIONS.

## 2017-05-03 NOTE — NUR
***PT NOTES***
CLEARED BY RN FOR P.T. TX. 1ST ATTEMPT (1200), PATIENT SITTING UP IN A BSC &
REQUESTED TO BE SEEN AT A LATER TIME. PATIENT EDUCATED ON PARTICIPATING IN
THERAPY TO IMPROVE FUNCTIONAL MOBILITY & STRENGTH W/ G UNDERSTANDING. 2ND
ATTEMPT (1350), PATIENT IS SITTING UP IN A CHAIR. CNA PRESENT AT BEDSIDE
ASSISTING PATIENT. PATIENT DECLINED TX AT THIS TIME DUE TO PATIENT BEING D/C
FROM HOSPITAL. PATIENT DRESSED IN REGULAR CLOTHING. EDUCATED PATIENT ON SAFETY
FOR FALL PREVENTION W/ G UNDERSTANDING. WILL NOTIFY PRIMARY THERAPIST.
PVE(2)

## 2018-06-08 ENCOUNTER — HOSPITAL ENCOUNTER (INPATIENT)
Dept: HOSPITAL 1 - MU | Age: 73
LOS: 4 days | Discharge: NURSING, REHAB OR LONG-TERM CARE | DRG: 469 | End: 2018-06-12
Attending: NEUROMUSCULOSKELETAL MEDICINE, SPORTS MEDICINE | Admitting: FAMILY MEDICINE
Payer: COMMERCIAL

## 2018-06-08 VITALS — SYSTOLIC BLOOD PRESSURE: 188 MMHG | DIASTOLIC BLOOD PRESSURE: 67 MMHG

## 2018-06-08 VITALS — DIASTOLIC BLOOD PRESSURE: 94 MMHG | SYSTOLIC BLOOD PRESSURE: 157 MMHG

## 2018-06-08 VITALS — BODY MASS INDEX: 34.53 KG/M2 | HEIGHT: 67 IN | WEIGHT: 220 LBS

## 2018-06-08 VITALS — DIASTOLIC BLOOD PRESSURE: 76 MMHG | SYSTOLIC BLOOD PRESSURE: 183 MMHG

## 2018-06-08 DIAGNOSIS — J44.9: ICD-10-CM

## 2018-06-08 DIAGNOSIS — E11.22: ICD-10-CM

## 2018-06-08 DIAGNOSIS — J84.10: ICD-10-CM

## 2018-06-08 DIAGNOSIS — J96.20: ICD-10-CM

## 2018-06-08 DIAGNOSIS — E66.9: ICD-10-CM

## 2018-06-08 DIAGNOSIS — F03.90: ICD-10-CM

## 2018-06-08 DIAGNOSIS — M17.12: Primary | ICD-10-CM

## 2018-06-08 DIAGNOSIS — I12.9: ICD-10-CM

## 2018-06-08 DIAGNOSIS — E44.0: ICD-10-CM

## 2018-06-08 DIAGNOSIS — M21.062: ICD-10-CM

## 2018-06-08 DIAGNOSIS — K59.09: ICD-10-CM

## 2018-06-08 DIAGNOSIS — E11.40: ICD-10-CM

## 2018-06-08 DIAGNOSIS — G47.33: ICD-10-CM

## 2018-06-08 DIAGNOSIS — I48.91: ICD-10-CM

## 2018-06-08 DIAGNOSIS — N17.0: ICD-10-CM

## 2018-06-08 DIAGNOSIS — E11.65: ICD-10-CM

## 2018-06-08 DIAGNOSIS — N18.3: ICD-10-CM

## 2018-06-08 DIAGNOSIS — Z95.0: ICD-10-CM

## 2018-06-08 DIAGNOSIS — N39.0: ICD-10-CM

## 2018-06-08 LAB
ALBUMIN SERPL-MCNC: 3.1 G/DL (ref 3.4–5)
ALP SERPL-CCNC: 92 U/L (ref 46–116)
ALT SERPL-CCNC: 12 U/L (ref 14–59)
AMYLASE SERPL-CCNC: 53 U/L (ref 25–115)
AST SERPL-CCNC: 19 U/L (ref 15–37)
BASOPHILS NFR BLD: 0 % (ref 0–2)
BILIRUB SERPL-MCNC: 0.29 MG/DL (ref 0.2–1)
BUN SERPL-MCNC: 28 MG/DL (ref 7–18)
CALCIUM SERPL-MCNC: 8.6 MG/DL (ref 8.5–10.1)
CHLORIDE SERPL-SCNC: 110 MMOL/L (ref 98–107)
CHOLEST SERPL-MCNC: 161 MG/DL (ref ?–200)
CHOLEST/HDLC SERPL: 3.1 MG/DL
CO2 SERPL-SCNC: 28 MMOL/L (ref 21–32)
CREAT SERPL-MCNC: 1.3 MG/DL (ref 0.6–1)
ERYTHROCYTE [DISTWIDTH] IN BLOOD BY AUTOMATED COUNT: 15.2 % (ref 11.5–14.5)
GFR SERPLBLD BASED ON 1.73 SQ M-ARVRAT: (no result) ML/MIN
GLUCOSE SERPL-MCNC: 142 MG/DL (ref 74–106)
HDLC SERPL-MCNC: 52 MG/DL (ref 40–60)
LIPASE SERPL-CCNC: 543 IU/L (ref 73–393)
MAGNESIUM SERPL-MCNC: 1.7 MG/DL (ref 1.8–2.4)
MICROSCOPIC UR-IMP: YES
MONOCYTES NFR BLD: 9 % (ref 0–7)
NEUTS BAND NFR BLD: 0 % (ref 0–10)
NEUTS SEG NFR BLD MANUAL: 85 % (ref 37–75)
PHOSPHATE SERPL-MCNC: 4.2 MG/DL (ref 2.5–4.9)
PLAT MORPH BLD: (no result)
PLATELET # BLD: 70 X10^3MCL (ref 130–400)
POTASSIUM SERPL-SCNC: 4.5 MMOL/L (ref 3.5–5.1)
PROT SERPL-MCNC: 6.6 G/DL (ref 6.4–8.2)
RBC # UR STRIP.AUTO: (no result) /UL
RBC MORPH BLD: NORMAL
SODIUM SERPL-SCNC: 148 MMOL/L (ref 136–145)
T3 SERPL-MCNC: 1.11 NG/ML
T3RU NFR SERPL: 36 % UPTAKE (ref 30–39)
T4 FREE SERPL-MCNC: 1.12 NG/DL (ref 0.76–1.46)
T4 SERPL-MCNC: 7.2 UG/DL (ref 4.7–13.3)
T4/T3 UPTAKE INDEX SERPL: 2.6 UG/DL (ref 1.4–4.5)
TRIGL SERPL-MCNC: 139 MG/DL (ref ?–150)
UA SPECIFIC GRAVITY: 1.02 (ref 1–1.03)

## 2018-06-08 PROCEDURE — 0SRD069 REPLACEMENT OF LEFT KNEE JOINT WITH OXIDIZED ZIRCONIUM ON POLYETHYLENE SYNTHETIC SUBSTITUTE, CEMENTED, OPEN APPROACH: ICD-10-PCS | Performed by: NEUROMUSCULOSKELETAL MEDICINE, SPORTS MEDICINE

## 2018-06-08 PROCEDURE — 0MNP0ZZ RELEASE LEFT KNEE BURSA AND LIGAMENT, OPEN APPROACH: ICD-10-PCS | Performed by: NEUROMUSCULOSKELETAL MEDICINE, SPORTS MEDICINE

## 2018-06-08 PROCEDURE — C1713 ANCHOR/SCREW BN/BN,TIS/BN: HCPCS

## 2018-06-08 PROCEDURE — C1776 JOINT DEVICE (IMPLANTABLE): HCPCS

## 2018-06-09 VITALS — SYSTOLIC BLOOD PRESSURE: 173 MMHG | DIASTOLIC BLOOD PRESSURE: 52 MMHG

## 2018-06-09 VITALS — SYSTOLIC BLOOD PRESSURE: 138 MMHG | DIASTOLIC BLOOD PRESSURE: 88 MMHG

## 2018-06-09 VITALS — DIASTOLIC BLOOD PRESSURE: 78 MMHG | SYSTOLIC BLOOD PRESSURE: 181 MMHG

## 2018-06-09 VITALS — DIASTOLIC BLOOD PRESSURE: 48 MMHG | SYSTOLIC BLOOD PRESSURE: 112 MMHG

## 2018-06-09 VITALS — SYSTOLIC BLOOD PRESSURE: 148 MMHG | DIASTOLIC BLOOD PRESSURE: 55 MMHG

## 2018-06-09 VITALS — SYSTOLIC BLOOD PRESSURE: 163 MMHG | DIASTOLIC BLOOD PRESSURE: 70 MMHG

## 2018-06-09 LAB
BASOPHILS NFR BLD: 0.4 % (ref 0–2)
BUN SERPL-MCNC: 28 MG/DL (ref 7–18)
CALCIUM SERPL-MCNC: 8.7 MG/DL (ref 8.5–10.1)
CHLORIDE SERPL-SCNC: 103 MMOL/L (ref 98–107)
CO2 SERPL-SCNC: 27.4 MMOL/L (ref 21–32)
CREAT SERPL-MCNC: 1.3 MG/DL (ref 0.6–1)
ERYTHROCYTE [DISTWIDTH] IN BLOOD BY AUTOMATED COUNT: 14.9 % (ref 11.5–14.5)
GFR SERPLBLD BASED ON 1.73 SQ M-ARVRAT: (no result) ML/MIN
GLUCOSE SERPL-MCNC: 139 MG/DL (ref 74–106)
MAGNESIUM SERPL-MCNC: 2.1 MG/DL (ref 1.8–2.4)
PHOSPHATE SERPL-MCNC: 3.8 MG/DL (ref 2.5–4.9)
PLATELET # BLD: 180 X10^3MCL (ref 130–400)
POTASSIUM SERPL-SCNC: 4.6 MMOL/L (ref 3.5–5.1)
SODIUM SERPL-SCNC: 137 MMOL/L (ref 136–145)

## 2018-06-10 VITALS — DIASTOLIC BLOOD PRESSURE: 94 MMHG | SYSTOLIC BLOOD PRESSURE: 139 MMHG

## 2018-06-10 VITALS — DIASTOLIC BLOOD PRESSURE: 56 MMHG | SYSTOLIC BLOOD PRESSURE: 149 MMHG

## 2018-06-10 VITALS — SYSTOLIC BLOOD PRESSURE: 129 MMHG | DIASTOLIC BLOOD PRESSURE: 48 MMHG

## 2018-06-10 VITALS — DIASTOLIC BLOOD PRESSURE: 68 MMHG | SYSTOLIC BLOOD PRESSURE: 158 MMHG

## 2018-06-10 LAB
BASOPHILS NFR BLD: 0.4 % (ref 0–2)
BUN SERPL-MCNC: 29 MG/DL (ref 7–18)
CALCIUM SERPL-MCNC: 8.7 MG/DL (ref 8.5–10.1)
CHLORIDE SERPL-SCNC: 100 MMOL/L (ref 98–107)
CO2 SERPL-SCNC: 29 MMOL/L (ref 21–32)
CREAT SERPL-MCNC: 1.2 MG/DL (ref 0.6–1)
ERYTHROCYTE [DISTWIDTH] IN BLOOD BY AUTOMATED COUNT: 15.4 % (ref 11.5–14.5)
GFR SERPLBLD BASED ON 1.73 SQ M-ARVRAT: (no result) ML/MIN
GLUCOSE SERPL-MCNC: 118 MG/DL (ref 74–106)
MAGNESIUM SERPL-MCNC: 1.9 MG/DL (ref 1.8–2.4)
PHOSPHATE SERPL-MCNC: 3.3 MG/DL (ref 2.5–4.9)
PLATELET # BLD: 172 X10^3MCL (ref 130–400)
POTASSIUM SERPL-SCNC: 4.3 MMOL/L (ref 3.5–5.1)
SODIUM SERPL-SCNC: 136 MMOL/L (ref 136–145)

## 2018-06-11 VITALS — DIASTOLIC BLOOD PRESSURE: 64 MMHG | SYSTOLIC BLOOD PRESSURE: 145 MMHG

## 2018-06-11 VITALS — SYSTOLIC BLOOD PRESSURE: 154 MMHG | DIASTOLIC BLOOD PRESSURE: 61 MMHG

## 2018-06-11 VITALS — SYSTOLIC BLOOD PRESSURE: 156 MMHG | DIASTOLIC BLOOD PRESSURE: 49 MMHG

## 2018-06-11 VITALS — SYSTOLIC BLOOD PRESSURE: 145 MMHG | DIASTOLIC BLOOD PRESSURE: 61 MMHG

## 2018-06-11 LAB
BASOPHILS NFR BLD: 0.3 % (ref 0–2)
BUN SERPL-MCNC: 31 MG/DL (ref 7–18)
CALCIUM SERPL-MCNC: 9 MG/DL (ref 8.5–10.1)
CHLORIDE SERPL-SCNC: 102 MMOL/L (ref 98–107)
CO2 SERPL-SCNC: 32.2 MMOL/L (ref 21–32)
CREAT SERPL-MCNC: 1.2 MG/DL (ref 0.6–1)
ERYTHROCYTE [DISTWIDTH] IN BLOOD BY AUTOMATED COUNT: 15.5 % (ref 11.5–14.5)
GFR SERPLBLD BASED ON 1.73 SQ M-ARVRAT: (no result) ML/MIN
GLUCOSE SERPL-MCNC: 105 MG/DL (ref 74–106)
MAGNESIUM SERPL-MCNC: 1.9 MG/DL (ref 1.8–2.4)
PHOSPHATE SERPL-MCNC: 3.3 MG/DL (ref 2.5–4.9)
PLATELET # BLD: 185 X10^3MCL (ref 130–400)
POTASSIUM SERPL-SCNC: 5 MMOL/L (ref 3.5–5.1)
SODIUM SERPL-SCNC: 140 MMOL/L (ref 136–145)

## 2018-06-12 VITALS — DIASTOLIC BLOOD PRESSURE: 53 MMHG | SYSTOLIC BLOOD PRESSURE: 124 MMHG

## 2018-06-12 VITALS — DIASTOLIC BLOOD PRESSURE: 71 MMHG | SYSTOLIC BLOOD PRESSURE: 118 MMHG

## 2018-06-12 VITALS — SYSTOLIC BLOOD PRESSURE: 118 MMHG | DIASTOLIC BLOOD PRESSURE: 71 MMHG

## 2018-06-12 LAB
BASOPHILS NFR BLD: 0.5 % (ref 0–2)
BUN SERPL-MCNC: 37 MG/DL (ref 7–18)
CALCIUM SERPL-MCNC: 9.2 MG/DL (ref 8.5–10.1)
CHLORIDE SERPL-SCNC: 100 MMOL/L (ref 98–107)
CO2 SERPL-SCNC: 32 MMOL/L (ref 21–32)
CREAT SERPL-MCNC: 1.3 MG/DL (ref 0.6–1)
ERYTHROCYTE [DISTWIDTH] IN BLOOD BY AUTOMATED COUNT: 15.5 % (ref 11.5–14.5)
GFR SERPLBLD BASED ON 1.73 SQ M-ARVRAT: (no result) ML/MIN
GLUCOSE SERPL-MCNC: 94 MG/DL (ref 74–106)
MAGNESIUM SERPL-MCNC: 2 MG/DL (ref 1.8–2.4)
PHOSPHATE SERPL-MCNC: 3.3 MG/DL (ref 2.5–4.9)
PLATELET # BLD: 228 X10^3MCL (ref 130–400)
POTASSIUM SERPL-SCNC: 4.2 MMOL/L (ref 3.5–5.1)
SODIUM SERPL-SCNC: 139 MMOL/L (ref 136–145)

## 2019-02-04 ENCOUNTER — HOSPITAL ENCOUNTER (EMERGENCY)
Dept: HOSPITAL 1 - ED | Age: 74
Discharge: HOME | End: 2019-02-04
Payer: COMMERCIAL

## 2023-07-14 NOTE — NUR
PT REQUESTED FOR MED FOR ANXIETY AND FOR SLEEP. ATIVAN 1 MG PO GIVEN. PT
REPOSITIONED COMFORTABLY IN BED. KEPT HER WARM AND COMFORTABLE. Methotrexate Counseling:  Patient counseled regarding adverse effects of methotrexate including but not limited to nausea, vomiting, abnormalities in liver function tests. Patients may develop mouth sores, rash, diarrhea, and abnormalities in blood counts. The patient understands that monitoring is required including LFT's and blood counts.  There is a rare possibility of scarring of the liver and lung problems that can occur when taking methotrexate. Persistent nausea, loss of appetite, pale stools, dark urine, cough, and shortness of breath should be reported immediately. Patient advised to discontinue methotrexate treatment at least three months before attempting to become pregnant.  I discussed the need for folate supplements while taking methotrexate.  These supplements can decrease side effects during methotrexate treatment. The patient verbalized understanding of the proper use and possible adverse effects of methotrexate.  All of the patient's questions and concerns were addressed.